# Patient Record
Sex: FEMALE | Race: WHITE | NOT HISPANIC OR LATINO | URBAN - METROPOLITAN AREA
[De-identification: names, ages, dates, MRNs, and addresses within clinical notes are randomized per-mention and may not be internally consistent; named-entity substitution may affect disease eponyms.]

---

## 2019-01-10 ENCOUNTER — APPOINTMENT (OUTPATIENT)
Age: 24
Setting detail: DERMATOLOGY
End: 2019-01-15

## 2019-01-10 ENCOUNTER — RX ONLY (RX ONLY)
Age: 24
End: 2019-01-10

## 2019-01-10 VITALS — HEIGHT: 68 IN | WEIGHT: 150 LBS

## 2019-01-10 DIAGNOSIS — D22 MELANOCYTIC NEVI: ICD-10-CM

## 2019-01-10 DIAGNOSIS — L71.8 OTHER ROSACEA: ICD-10-CM

## 2019-01-10 PROBLEM — D22.5 MELANOCYTIC NEVI OF TRUNK: Status: ACTIVE | Noted: 2019-01-10

## 2019-01-10 PROBLEM — D22.4 MELANOCYTIC NEVI OF SCALP AND NECK: Status: ACTIVE | Noted: 2019-01-10

## 2019-01-10 PROBLEM — D22.62 MELANOCYTIC NEVI OF LEFT UPPER LIMB, INCLUDING SHOULDER: Status: ACTIVE | Noted: 2019-01-10

## 2019-01-10 PROBLEM — D22.61 MELANOCYTIC NEVI OF RIGHT UPPER LIMB, INCLUDING SHOULDER: Status: ACTIVE | Noted: 2019-01-10

## 2019-01-10 PROCEDURE — OTHER TREATMENT REGIMEN: OTHER

## 2019-01-10 PROCEDURE — OTHER COUNSELING: OTHER

## 2019-01-10 PROCEDURE — OTHER REASSURANCE: OTHER

## 2019-01-10 PROCEDURE — 99203 OFFICE O/P NEW LOW 30 MIN: CPT

## 2019-01-10 PROCEDURE — OTHER MIPS QUALITY: OTHER

## 2019-01-10 PROCEDURE — OTHER FOLLOW UP FOR NEXT VISIT: OTHER

## 2019-01-10 ASSESSMENT — LOCATION DETAILED DESCRIPTION DERM
LOCATION DETAILED: PERIUMBILICAL SKIN
LOCATION DETAILED: RIGHT SUPERIOR LATERAL MALAR CHEEK
LOCATION DETAILED: RIGHT INFERIOR CENTRAL MALAR CHEEK
LOCATION DETAILED: LEFT CENTRAL ZYGOMA
LOCATION DETAILED: RIGHT SUPERIOR LATERAL MIDBACK
LOCATION DETAILED: LEFT SUPERIOR CENTRAL BUCCAL CHEEK
LOCATION DETAILED: LEFT VENTRAL PROXIMAL FOREARM
LOCATION DETAILED: LEFT LATERAL DISTAL UPPER ARM
LOCATION DETAILED: RIGHT VENTRAL PROXIMAL FOREARM
LOCATION DETAILED: RIGHT ANTERIOR SHOULDER
LOCATION DETAILED: RIGHT MEDIAL TRAPEZIAL NECK
LOCATION DETAILED: RIGHT CENTRAL BUCCAL CHEEK
LOCATION DETAILED: RIGHT PROXIMAL POSTERIOR UPPER ARM
LOCATION DETAILED: LEFT MEDIAL UPPER BACK
LOCATION DETAILED: LEFT INFERIOR CENTRAL MALAR CHEEK
LOCATION DETAILED: LEFT LATERAL ABDOMEN
LOCATION DETAILED: LEFT LATERAL PROXIMAL UPPER ARM
LOCATION DETAILED: LEFT SUPERIOR LATERAL UPPER BACK
LOCATION DETAILED: RIGHT LATERAL UPPER BACK
LOCATION DETAILED: RIGHT DISTAL RADIAL DORSAL FOREARM
LOCATION DETAILED: RIGHT PROXIMAL LATERAL POSTERIOR UPPER ARM

## 2019-01-10 ASSESSMENT — LOCATION ZONE DERM
LOCATION ZONE: ARM
LOCATION ZONE: FACE
LOCATION ZONE: TRUNK
LOCATION ZONE: NECK

## 2019-01-10 ASSESSMENT — LOCATION SIMPLE DESCRIPTION DERM
LOCATION SIMPLE: LEFT UPPER ARM
LOCATION SIMPLE: ABDOMEN
LOCATION SIMPLE: LEFT CHEEK
LOCATION SIMPLE: RIGHT FOREARM
LOCATION SIMPLE: RIGHT SHOULDER
LOCATION SIMPLE: LEFT UPPER BACK
LOCATION SIMPLE: RIGHT POSTERIOR UPPER ARM
LOCATION SIMPLE: RIGHT LOWER BACK
LOCATION SIMPLE: LEFT ZYGOMA
LOCATION SIMPLE: POSTERIOR NECK
LOCATION SIMPLE: RIGHT CHEEK
LOCATION SIMPLE: RIGHT UPPER BACK
LOCATION SIMPLE: LEFT FOREARM

## 2019-01-10 ASSESSMENT — SEVERITY ASSESSMENT OVERALL AMONG ALL PATIENTS
IN YOUR EXPERIENCE, AMONG ALL PATIENTS YOU HAVE SEEN WITH THIS CONDITION, HOW SEVERE IS THIS PATIENT'S CONDITION?: MINIMAL

## 2019-01-10 NOTE — PROCEDURE: REASSURANCE
Detail Level: Zone
Hide Include Location In Plan Question?: No
Additional Note: Normal upper body exam, no abnormal moles or suspicious lesions, follow up annually. Patient will continue to monitor her moles and follow up if there are any changes. I did recommend follow up annually for a full body skin exam
Include Location In Plan?: Yes

## 2019-01-10 NOTE — HPI: EVALUATION OF SKIN LESION(S)
How Severe Are Your Spot(S)?: mild
Have Your Spot(S) Been Treated In The Past?: has not been treated
Hpi Title: Evaluation of Skin Lesions
Additional History: Upper body exam.

## 2019-01-10 NOTE — PROCEDURE: TREATMENT REGIMEN
Samples Given: Aczone 7.5% x2
Detail Level: Simple
Plan: Clinically it is difficult to determine if this represent some form of rosacea or other glandular inflammation. Based on the lack of impressive clinical exam we did give her samples of Aczone to use once daily as directed. If she feels this medication is helping she will call for a prescription. If the problem reoccurs or becomes more noticeable she will call or follow up for re-examination

## 2020-03-04 ENCOUNTER — APPOINTMENT (OUTPATIENT)
Age: 25
Setting detail: DERMATOLOGY
End: 2020-03-04

## 2020-03-04 VITALS — WEIGHT: 130 LBS | HEIGHT: 67 IN

## 2020-03-04 DIAGNOSIS — D22 MELANOCYTIC NEVI: ICD-10-CM

## 2020-03-04 DIAGNOSIS — L57.8 OTHER SKIN CHANGES DUE TO CHRONIC EXPOSURE TO NONIONIZING RADIATION: ICD-10-CM

## 2020-03-04 DIAGNOSIS — L71.8 OTHER ROSACEA: ICD-10-CM

## 2020-03-04 PROBLEM — F41.9 ANXIETY DISORDER, UNSPECIFIED: Status: ACTIVE | Noted: 2020-03-04

## 2020-03-04 PROBLEM — D22.61 MELANOCYTIC NEVI OF RIGHT UPPER LIMB, INCLUDING SHOULDER: Status: ACTIVE | Noted: 2020-03-04

## 2020-03-04 PROBLEM — D22.5 MELANOCYTIC NEVI OF TRUNK: Status: ACTIVE | Noted: 2020-03-04

## 2020-03-04 PROBLEM — D22.62 MELANOCYTIC NEVI OF LEFT UPPER LIMB, INCLUDING SHOULDER: Status: ACTIVE | Noted: 2020-03-04

## 2020-03-04 PROCEDURE — 99214 OFFICE O/P EST MOD 30 MIN: CPT

## 2020-03-04 PROCEDURE — OTHER SUNSCREEN RECOMMENDATIONS: OTHER

## 2020-03-04 PROCEDURE — OTHER FOLLOW UP FOR NEXT VISIT: OTHER

## 2020-03-04 PROCEDURE — OTHER COUNSELING: OTHER

## 2020-03-04 PROCEDURE — OTHER REASSURANCE: OTHER

## 2020-03-04 PROCEDURE — OTHER TREATMENT REGIMEN: OTHER

## 2020-03-04 PROCEDURE — OTHER MIPS QUALITY: OTHER

## 2020-03-04 PROCEDURE — OTHER PRESCRIPTION: OTHER

## 2020-03-04 RX ORDER — DAPSONE 75 MG/G
THIN COAT GEL TOPICAL QHS
Qty: 1 | Refills: 2 | Status: ERX | COMMUNITY
Start: 2020-03-04

## 2020-03-04 ASSESSMENT — LOCATION DETAILED DESCRIPTION DERM
LOCATION DETAILED: RIGHT CENTRAL BUCCAL CHEEK
LOCATION DETAILED: RIGHT INFERIOR CENTRAL MALAR CHEEK
LOCATION DETAILED: RIGHT INFERIOR MEDIAL FOREHEAD
LOCATION DETAILED: MID TRAPEZIAL NECK
LOCATION DETAILED: RIGHT DISTAL POSTERIOR UPPER ARM
LOCATION DETAILED: LEFT CENTRAL ZYGOMA
LOCATION DETAILED: RIGHT LATERAL ABDOMEN
LOCATION DETAILED: LEFT MEDIAL UPPER BACK
LOCATION DETAILED: LEFT ANTERIOR SHOULDER
LOCATION DETAILED: EPIGASTRIC SKIN
LOCATION DETAILED: LEFT INFERIOR UPPER BACK
LOCATION DETAILED: LEFT SUPERIOR CENTRAL BUCCAL CHEEK
LOCATION DETAILED: RIGHT SUPERIOR LATERAL MALAR CHEEK
LOCATION DETAILED: LEFT INFERIOR CENTRAL MALAR CHEEK

## 2020-03-04 ASSESSMENT — LOCATION SIMPLE DESCRIPTION DERM
LOCATION SIMPLE: ABDOMEN
LOCATION SIMPLE: LEFT CHEEK
LOCATION SIMPLE: RIGHT POSTERIOR UPPER ARM
LOCATION SIMPLE: LEFT UPPER BACK
LOCATION SIMPLE: RIGHT CHEEK
LOCATION SIMPLE: LEFT ZYGOMA
LOCATION SIMPLE: LEFT SHOULDER
LOCATION SIMPLE: TRAPEZIAL NECK
LOCATION SIMPLE: RIGHT FOREHEAD

## 2020-03-04 ASSESSMENT — LOCATION ZONE DERM
LOCATION ZONE: FACE
LOCATION ZONE: ARM
LOCATION ZONE: TRUNK
LOCATION ZONE: NECK

## 2020-03-04 ASSESSMENT — SEVERITY ASSESSMENT OVERALL AMONG ALL PATIENTS: IN YOUR EXPERIENCE, AMONG ALL PATIENTS YOU HAVE SEEN WITH THIS CONDITION, HOW SEVERE IS THIS PATIENT'S CONDITION?: MILD

## 2020-03-04 NOTE — PROCEDURE: REASSURANCE
Hide Additional Notes?: No
Include Location In Plan?: Yes
Additional Note: Normal FBSE, no abnormal moles or suspicious lesions. Follow up if lesions change or becomes painful
Detail Level: Detailed
Additional Note: Normal FBSE, patient counseled regarding sunscreen and how to use properly. Follow up annually
Detail Level: Generalized
Additional Note: Both of the moles were slightly darker on gross inspection but on dermatoscopic exam appear completely benign. I did make the patient aware of the lesions. She will monitor and follow up if there are any changes. Otherwise follow up annually

## 2020-03-04 NOTE — PROCEDURE: TREATMENT REGIMEN
Detail Level: Simple
Plan: Mild but persistent inflammation. Patient will use the prescription of Aczone 7.5% once daily at night. She will call if this medication is not working or if she continues to have flareups. Otherwise follow up annually

## 2020-03-04 NOTE — HPI: EVALUATION OF SKIN LESION(S)
What Type Of Note Output Would You Prefer (Optional)?: Bullet Format
How Severe Are Your Spot(S)?: mild
Have Your Spot(S) Been Treated In The Past?: has not been treated
Hpi Title: Evaluation of Skin Lesions
Additional History: TBE. Patient here for her annual full body skin exam. She has no specific lesions that are changing or bothering her. She does have numerous moles but none of them are changing. She does spend occasional time in the sun but is very good about sunscreen use and sun protection. She has no previous history of pre-cancer, skin cancer or melanoma

## 2020-11-10 ENCOUNTER — APPOINTMENT (OUTPATIENT)
Age: 25
Setting detail: DERMATOLOGY
End: 2020-11-10

## 2020-11-10 DIAGNOSIS — L30.9 DERMATITIS, UNSPECIFIED: ICD-10-CM

## 2020-11-10 PROCEDURE — OTHER COUNSELING: OTHER

## 2020-11-10 PROCEDURE — OTHER PRESCRIPTION: OTHER

## 2020-11-10 PROCEDURE — OTHER FOLLOW UP FOR NEXT VISIT: OTHER

## 2020-11-10 PROCEDURE — 99213 OFFICE O/P EST LOW 20 MIN: CPT | Mod: 95

## 2020-11-10 PROCEDURE — OTHER TREATMENT REGIMEN: OTHER

## 2020-11-10 RX ORDER — DESONIDE 0.5 MG/G
THIN COAT CREAM TOPICAL DAILY
Qty: 1 | Refills: 1 | Status: ERX | COMMUNITY
Start: 2020-11-10

## 2020-11-10 ASSESSMENT — SEVERITY ASSESSMENT: SEVERITY: MODERATE

## 2020-11-10 ASSESSMENT — LOCATION SIMPLE DESCRIPTION DERM
LOCATION SIMPLE: RIGHT SUPERIOR EYELID
LOCATION SIMPLE: RIGHT INFERIOR EYELID
LOCATION SIMPLE: LEFT INFERIOR EYELID
LOCATION SIMPLE: LEFT SUPERIOR EYELID

## 2020-11-10 ASSESSMENT — LOCATION DETAILED DESCRIPTION DERM
LOCATION DETAILED: LEFT LATERAL INFERIOR PRESEPTAL REGION
LOCATION DETAILED: RIGHT LATERAL INFERIOR EYELID
LOCATION DETAILED: LEFT MEDIAL SUPERIOR EYELID
LOCATION DETAILED: RIGHT LATERAL SUPERIOR EYELID

## 2020-11-10 ASSESSMENT — BSA RASH: BSA RASH: 1

## 2020-11-10 ASSESSMENT — PAIN INTENSITY VAS: HOW INTENSE IS YOUR PAIN 0 BEING NO PAIN, 10 BEING THE MOST SEVERE PAIN POSSIBLE?: 2/10 PAIN

## 2020-11-10 ASSESSMENT — LOCATION ZONE DERM: LOCATION ZONE: EYELID

## 2020-11-10 NOTE — PROCEDURE: TREATMENT REGIMEN
Otc Regimen: Zyrtec 10 mg BID
Detail Level: Simple
Initiate Treatment: Desonide cream
Plan: Based on photos from email and exam over telehealth this appears to be consistent with an allergic dermatitis. It’s difficult to say what exactly might have caused this reaction. Patient is going to take over-the-counter Zyrtec 10 mg twice daily and use the topical steroid once or twice daily for up to two weeks. She will call or contact us regarding progress or if the problem continues to reoccur

## 2020-11-10 NOTE — HPI: RASH
What Type Of Note Output Would You Prefer (Optional)?: Bullet Format
Is The Patient Presenting As Previously Scheduled?: Yes
How Severe Is Your Rash?: moderate
Is This A New Presentation, Or A Follow-Up?: Rash
Additional History: Patient has been having redness, scaling, itching and swelling on the right and left upper and lower eyelids for the past two weeks. She said it started after she used Vaseline and Aquaphor because of some mild dryness. She would have swelling the next day and then worsening of the redness, dryness and itching. The only product she has been able to use without any irritation is the Cerave moisturizing lotion. Everything else seems to irritate it. She has not started any new medication‘s. No other skin surfaces are affected